# Patient Record
Sex: FEMALE | Race: WHITE | Employment: FULL TIME | URBAN - METROPOLITAN AREA
[De-identification: names, ages, dates, MRNs, and addresses within clinical notes are randomized per-mention and may not be internally consistent; named-entity substitution may affect disease eponyms.]

---

## 2021-10-14 ENCOUNTER — TELEPHONE (OUTPATIENT)
Dept: OBGYN | Age: 46
End: 2021-10-14

## 2021-10-14 DIAGNOSIS — Z14.8 GENETIC CARRIER STATUS: ICD-10-CM

## 2021-10-14 DIAGNOSIS — Z91.89 INCREASED RISK OF BREAST CANCER: Primary | ICD-10-CM

## 2021-10-14 DIAGNOSIS — Z80.3 FAMILY HISTORY OF BREAST CANCER: ICD-10-CM

## 2021-10-14 NOTE — TELEPHONE ENCOUNTER
Pt states she had genetic testing done in July. Pt states the results recommend a Mammo every year and then a breast MRI 6 months after the Mammo. Genetic results in Media. Pt would like to know if we put in the order for a breast MRI. What do you recommend?

## 2021-11-15 ENCOUNTER — HOSPITAL ENCOUNTER (OUTPATIENT)
Dept: MRI IMAGING | Age: 46
Discharge: HOME | End: 2021-11-15

## 2021-11-15 DIAGNOSIS — Z80.3 FAMILY HISTORY OF BREAST CANCER: ICD-10-CM

## 2021-11-15 DIAGNOSIS — Z14.8 GENETIC CARRIER STATUS: ICD-10-CM

## 2021-11-15 DIAGNOSIS — Z91.89 INCREASED RISK OF BREAST CANCER: ICD-10-CM

## 2021-11-15 PROCEDURE — 77049 MRI BREAST C-+ W/CAD BI: CPT

## 2021-11-15 PROCEDURE — A9577 INJ MULTIHANCE: HCPCS | Performed by: OBSTETRICS & GYNECOLOGY

## 2021-11-15 PROCEDURE — 6360000004 HC RX CONTRAST MEDICATION: Performed by: OBSTETRICS & GYNECOLOGY

## 2021-11-15 RX ADMIN — GADOBENATE DIMEGLUMINE 11 ML: 529 INJECTION, SOLUTION INTRAVENOUS at 14:04

## 2022-07-11 ENCOUNTER — TELEPHONE (OUTPATIENT)
Dept: OBGYN | Age: 47
End: 2022-07-11

## 2022-07-11 NOTE — TELEPHONE ENCOUNTER
Pt currently has Marcial Duckworth was placed 1/24/2017. Pt would like to have her Mirena removed and replaced at her annual 8/22/22 as she lives in [place] and still comes here for her appts. Is this okay?

## 2022-08-22 ENCOUNTER — HOSPITAL ENCOUNTER (OUTPATIENT)
Age: 47
Setting detail: SPECIMEN
Discharge: HOME | End: 2022-08-22
Payer: COMMERCIAL

## 2022-08-22 ENCOUNTER — OFFICE VISIT (OUTPATIENT)
Dept: OBGYN | Age: 47
End: 2022-08-22
Payer: COMMERCIAL

## 2022-08-22 VITALS
WEIGHT: 124 LBS | SYSTOLIC BLOOD PRESSURE: 117 MMHG | HEIGHT: 67 IN | DIASTOLIC BLOOD PRESSURE: 77 MMHG | BODY MASS INDEX: 19.46 KG/M2

## 2022-08-22 DIAGNOSIS — Z11.51 SPECIAL SCREENING EXAMINATION FOR HUMAN PAPILLOMAVIRUS (HPV): ICD-10-CM

## 2022-08-22 DIAGNOSIS — N92.6 IRREGULAR MENSES: ICD-10-CM

## 2022-08-22 DIAGNOSIS — Z30.433 ENCOUNTER FOR REMOVAL AND REINSERTION OF IUD: ICD-10-CM

## 2022-08-22 DIAGNOSIS — Z01.419 ENCOUNTER FOR ANNUAL ROUTINE GYNECOLOGICAL EXAMINATION: Primary | ICD-10-CM

## 2022-08-22 LAB
CONTROL: NORMAL
PREGNANCY TEST URINE, POC: NEGATIVE

## 2022-08-22 PROCEDURE — 87624 HPV HI-RISK TYP POOLED RSLT: CPT

## 2022-08-22 PROCEDURE — 58300 INSERT INTRAUTERINE DEVICE: CPT | Performed by: OBSTETRICS & GYNECOLOGY

## 2022-08-22 PROCEDURE — 88142 CYTOPATH C/V THIN LAYER: CPT

## 2022-08-22 PROCEDURE — 99396 PREV VISIT EST AGE 40-64: CPT | Performed by: OBSTETRICS & GYNECOLOGY

## 2022-08-22 PROCEDURE — 81025 URINE PREGNANCY TEST: CPT | Performed by: OBSTETRICS & GYNECOLOGY

## 2022-08-22 PROCEDURE — 58301 REMOVE INTRAUTERINE DEVICE: CPT | Performed by: OBSTETRICS & GYNECOLOGY

## 2022-08-22 RX ORDER — LEVONORGESTREL 52 MG/1
1 INTRAUTERINE DEVICE INTRAUTERINE ONCE
COMMUNITY

## 2022-08-22 SDOH — ECONOMIC STABILITY: TRANSPORTATION INSECURITY
IN THE PAST 12 MONTHS, HAS LACK OF TRANSPORTATION KEPT YOU FROM MEETINGS, WORK, OR FROM GETTING THINGS NEEDED FOR DAILY LIVING?: NO

## 2022-08-22 SDOH — ECONOMIC STABILITY: TRANSPORTATION INSECURITY
IN THE PAST 12 MONTHS, HAS THE LACK OF TRANSPORTATION KEPT YOU FROM MEDICAL APPOINTMENTS OR FROM GETTING MEDICATIONS?: NO

## 2022-08-22 SDOH — ECONOMIC STABILITY: FOOD INSECURITY: WITHIN THE PAST 12 MONTHS, THE FOOD YOU BOUGHT JUST DIDN'T LAST AND YOU DIDN'T HAVE MONEY TO GET MORE.: NEVER TRUE

## 2022-08-22 SDOH — ECONOMIC STABILITY: FOOD INSECURITY: WITHIN THE PAST 12 MONTHS, YOU WORRIED THAT YOUR FOOD WOULD RUN OUT BEFORE YOU GOT MONEY TO BUY MORE.: NEVER TRUE

## 2022-08-22 ASSESSMENT — PATIENT HEALTH QUESTIONNAIRE - PHQ9
SUM OF ALL RESPONSES TO PHQ QUESTIONS 1-9: 0
SUM OF ALL RESPONSES TO PHQ QUESTIONS 1-9: 0
SUM OF ALL RESPONSES TO PHQ9 QUESTIONS 1 & 2: 0
SUM OF ALL RESPONSES TO PHQ QUESTIONS 1-9: 0
SUM OF ALL RESPONSES TO PHQ QUESTIONS 1-9: 0
1. LITTLE INTEREST OR PLEASURE IN DOING THINGS: 0
2. FEELING DOWN, DEPRESSED OR HOPELESS: 0

## 2022-08-22 ASSESSMENT — SOCIAL DETERMINANTS OF HEALTH (SDOH): HOW HARD IS IT FOR YOU TO PAY FOR THE VERY BASICS LIKE FOOD, HOUSING, MEDICAL CARE, AND HEATING?: NOT HARD AT ALL

## 2022-08-22 NOTE — PROGRESS NOTES
22    Melvia Favor  1975    Chief Complaint   Patient presents with    Gynecologic Exam     Pt has mirena iud currently, no menses, is sexually active, Last pap smear was  normal. Last mammo was  normal     Procedure     Pt here for IUD removal and liletta insertion. Consent signed. Lot  [id number]         The patient is a 56 y.o. female, Joanna Rao who presents for her annual exam.  She is amenorrheic due to IUD. She is  sexually active. She is currently taking birth control. Birth control method is Mirena IUD. She reports additional symptoms of abnormal bleeding off IUD. Marii Sanders Pap smear history:  normal  Breast history: her most recent mammogram was in . The results were: Normal    Past Medical History:   Diagnosis Date    Dysmenorrhea     Irregular menses        No past surgical history on file. No family history on file. Social History     Tobacco Use    Smoking status: Never    Smokeless tobacco: Never   Vaping Use    Vaping Use: Never used   Substance Use Topics    Alcohol use: Yes    Drug use: Never       Current Outpatient Medications   Medication Sig Dispense Refill    levonorgestrel (LILETTA, 52 MG,) 20.1 MCG/DAY IUD IUD 52 mg 1 each by IntraUTERine route once       No current facility-administered medications for this visit. No Known Allergies          There is no immunization history on file for this patient. Review of Systems   Constitutional: Negative. Eyes: Negative. Respiratory: Negative. Cardiovascular: Negative. Gastrointestinal: Negative. Endocrine: Negative. Genitourinary: Negative. Musculoskeletal: Negative. Skin: Negative. Allergic/Immunologic: Negative. Neurological: Negative. Hematological: Negative. Psychiatric/Behavioral: Negative. /77   Ht 5' 7\" (1.702 m)   Wt 124 lb (56.2 kg)   BMI 19.42 kg/m²     Physical Exam  Exam conducted with a chaperone present.    Constitutional: Appearance: Normal appearance. HENT:      Head: Normocephalic and atraumatic. Nose: Nose normal.   Eyes:      Conjunctiva/sclera: Conjunctivae normal.   Cardiovascular:      Rate and Rhythm: Normal rate. Pulses: Normal pulses. Pulmonary:      Effort: Pulmonary effort is normal.   Chest:   Breasts:     Right: Normal. No axillary adenopathy or supraclavicular adenopathy. Left: Normal. No axillary adenopathy or supraclavicular adenopathy. Abdominal:      General: Abdomen is flat. Bowel sounds are normal.      Palpations: Abdomen is soft. Hernia: There is no hernia in the left inguinal area or right inguinal area. Genitourinary:     General: Normal vulva. Exam position: Lithotomy position. Labia:         Right: No rash, tenderness or lesion. Left: No rash, tenderness or lesion. Urethra: No prolapse. Vagina: Normal. No foreign body. No vaginal discharge, erythema, tenderness, bleeding, lesions or prolapsed vaginal walls. Cervix: No cervical motion tenderness, discharge, friability, lesion, erythema or cervical bleeding. Uterus: Normal. Not enlarged, not tender and no uterine prolapse. Adnexa: Right adnexa normal and left adnexa normal.        Right: No mass, tenderness or fullness. Left: No mass, tenderness or fullness. Musculoskeletal:      Cervical back: Normal range of motion and neck supple. Lymphadenopathy:      Upper Body:      Right upper body: No supraclavicular, axillary or pectoral adenopathy. Left upper body: No supraclavicular, axillary or pectoral adenopathy. Skin:     General: Skin is warm. Coloration: Skin is not ashen or cyanotic. Findings: No abrasion, abscess or bruising. Rash is not crusting or macular. Neurological:      Mental Status: She is alert and oriented to person, place, and time.        Results for orders placed or performed in visit on 08/22/22   POCT urine pregnancy   Result Value Ref Range    Preg Test, Ur negative     Control         Assessment and Plan   Diagnosis Orders   1. Encounter for annual routine gynecological examination        2. Irregular menses  POCT urine pregnancy    levonorgestrel (LILETTA) IUD 52 mg 1 each      3. Encounter for removal and reinsertion of IUD  POCT urine pregnancy    levonorgestrel ([place]) IUD 52 mg 1 each      4. Special screening examination for human papillomavirus (HPV)              Return in about 1 year (around 8/22/2023). Jasmine Adler MD      PRE-OP DIAGNOSIS: iregular menses, family planninr  POST-OP DIAGNOSIS: Same   PROCEDURE: IUD Removal   Performing Physician: Jasmine Adler MD  Risks: Risks were reviewed with patient in detail which include breakage of device and/or strings (which then may need to be removed by a specialist under anesthesia), bleeding, and pain. PROCEDURE:   The speculum was placed and the IUD strings visualized. Using ringed forceps, the IUD string was grasped and the device was removed wiothout difficulty. Bleeding was minimal.     Followup: The patient tolerated the procedure well without complications. Standard post-procedure care is explained and return precautions are given. Patient desires to use Shane Bitter for birth control. IUD Insertion Procedure Note    Pre-operative Diagnosis. Indication:  irregular menses, family planning    Post-operative Diagnosis: Same    Urine pregnancy test: negative today    Gonorrhea and Chlamydia: was not done. Procedure Details   The risks (including infection, bleeding, pain, and uterine perforation) and benefits of the procedure were explained to the patient and written informed consent was obtained. The patient was placed in the lithotomy position. Speculum was placed with visualization of the cervix. Cervix cleansed with Betadine. Tenaculum was placed on the anterior aspect of the cervix. Uterus sounded to 8 cm.  IUD inserted without difficulty per manufacturers guidelines. String visible and trimmed to 2.5 cm. Tenaculum was removed and hemostasis was noted. Patient tolerated procedure well. EBL: 1cc     Type of IUD: Liletta    Condition:  Stable    Complications:  None    ND:     Tami [id number]  1 unit                  Plan:  Post-procedure care instructions were reviewed. The patient was advised to call for any fever or for prolonged or severe pain or bleeding. She was advised to use OTC tylenol and/or ibuprofen as needed for mild to moderate pain. Will follow-up in 4 weeks for IUD check.      Frances Brooks MD

## 2022-08-25 LAB
HPV HIGH RISK: NOT DETECTED
HPV, GENOTYPE 16: NOT DETECTED
HPV, GENOTYPE 18: NOT DETECTED

## 2022-08-26 ASSESSMENT — ENCOUNTER SYMPTOMS
GASTROINTESTINAL NEGATIVE: 1
ALLERGIC/IMMUNOLOGIC NEGATIVE: 1
EYES NEGATIVE: 1
RESPIRATORY NEGATIVE: 1